# Patient Record
Sex: MALE | Race: WHITE
[De-identification: names, ages, dates, MRNs, and addresses within clinical notes are randomized per-mention and may not be internally consistent; named-entity substitution may affect disease eponyms.]

---

## 2020-07-18 ENCOUNTER — HOSPITAL ENCOUNTER (EMERGENCY)
Dept: HOSPITAL 8 - ED | Age: 80
Discharge: HOME | End: 2020-07-18
Payer: COMMERCIAL

## 2020-07-18 VITALS — SYSTOLIC BLOOD PRESSURE: 105 MMHG | DIASTOLIC BLOOD PRESSURE: 71 MMHG

## 2020-07-18 VITALS — HEIGHT: 75 IN | BODY MASS INDEX: 27.41 KG/M2 | WEIGHT: 220.46 LBS

## 2020-07-18 DIAGNOSIS — R00.0: ICD-10-CM

## 2020-07-18 DIAGNOSIS — R07.2: ICD-10-CM

## 2020-07-18 DIAGNOSIS — I48.0: Primary | ICD-10-CM

## 2020-07-18 DIAGNOSIS — I95.9: ICD-10-CM

## 2020-07-18 DIAGNOSIS — I10: ICD-10-CM

## 2020-07-18 LAB
ALBUMIN SERPL-MCNC: 3.1 G/DL (ref 3.4–5)
ALP SERPL-CCNC: 86 U/L (ref 45–117)
ALT SERPL-CCNC: 27 U/L (ref 12–78)
ANION GAP SERPL CALC-SCNC: 6 MMOL/L (ref 5–15)
BASOPHILS # BLD AUTO: 0.02 X10^3/UL (ref 0–0.1)
BASOPHILS NFR BLD AUTO: 0 % (ref 0–1)
BILIRUB SERPL-MCNC: 1 MG/DL (ref 0.2–1)
CALCIUM SERPL-MCNC: 8.3 MG/DL (ref 8.5–10.1)
CHLORIDE SERPL-SCNC: 105 MMOL/L (ref 98–107)
CREAT SERPL-MCNC: 1.12 MG/DL (ref 0.7–1.3)
EOSINOPHIL # BLD AUTO: 0.1 X10^3/UL (ref 0–0.4)
EOSINOPHIL NFR BLD AUTO: 2 % (ref 1–7)
ERYTHROCYTE [DISTWIDTH] IN BLOOD BY AUTOMATED COUNT: 14.2 % (ref 9.4–14.8)
LYMPHOCYTES # BLD AUTO: 1.38 X10^3/UL (ref 1–3.4)
LYMPHOCYTES NFR BLD AUTO: 23 % (ref 22–44)
MCH RBC QN AUTO: 31.5 PG (ref 27.5–34.5)
MCHC RBC AUTO-ENTMCNC: 32.5 G/DL (ref 33.2–36.2)
MCV RBC AUTO: 97.1 FL (ref 81–97)
MD: NO
MONOCYTES # BLD AUTO: 0.46 X10^3/UL (ref 0.2–0.8)
MONOCYTES NFR BLD AUTO: 8 % (ref 2–9)
NEUTROPHILS # BLD AUTO: 4.15 X10^3/UL (ref 1.8–6.8)
NEUTROPHILS NFR BLD AUTO: 68 % (ref 42–75)
PLATELET # BLD AUTO: 222 X10^3/UL (ref 130–400)
PMV BLD AUTO: 7 FL (ref 7.4–10.4)
PROT SERPL-MCNC: 6.1 G/DL (ref 6.4–8.2)
RBC # BLD AUTO: 4.64 X10^6/UL (ref 4.38–5.82)
T4 FREE SERPL-MCNC: 1.08 NG/DL (ref 0.76–1.46)
TROPONIN I SERPL-MCNC: < 0.015 NG/ML (ref 0–0.04)

## 2020-07-18 PROCEDURE — 96376 TX/PRO/DX INJ SAME DRUG ADON: CPT

## 2020-07-18 PROCEDURE — 36415 COLL VENOUS BLD VENIPUNCTURE: CPT

## 2020-07-18 PROCEDURE — 84439 ASSAY OF FREE THYROXINE: CPT

## 2020-07-18 PROCEDURE — 84443 ASSAY THYROID STIM HORMONE: CPT

## 2020-07-18 PROCEDURE — 84484 ASSAY OF TROPONIN QUANT: CPT

## 2020-07-18 PROCEDURE — 96361 HYDRATE IV INFUSION ADD-ON: CPT

## 2020-07-18 PROCEDURE — 71045 X-RAY EXAM CHEST 1 VIEW: CPT

## 2020-07-18 PROCEDURE — 96374 THER/PROPH/DIAG INJ IV PUSH: CPT

## 2020-07-18 PROCEDURE — 83735 ASSAY OF MAGNESIUM: CPT

## 2020-07-18 PROCEDURE — 85025 COMPLETE CBC W/AUTO DIFF WBC: CPT

## 2020-07-18 PROCEDURE — 80053 COMPREHEN METABOLIC PANEL: CPT

## 2020-07-18 PROCEDURE — 99291 CRITICAL CARE FIRST HOUR: CPT

## 2020-07-18 PROCEDURE — 99153 MOD SED SAME PHYS/QHP EA: CPT

## 2020-07-18 PROCEDURE — 93005 ELECTROCARDIOGRAM TRACING: CPT

## 2020-07-18 PROCEDURE — 99152 MOD SED SAME PHYS/QHP 5/>YRS: CPT

## 2020-07-18 NOTE — NUR
SPOKE WITH PT ABOUT POC. PT CURRENTLY STILL REFUSING CARDIOVERSION, STATES "I'M 
FEELING FINE NOW." PT AWARE HR REMAINS AS HIGH . PT CURRENTLY DENIES CP. 
PT REMAINS ON MONITORS, VSS. CONT TO MONITOR.

## 2020-07-18 NOTE — NUR
PT REMAINS A&OX4, REMAINS AT BASELINE MENTATION AND VS. PT CONTINUES TO DENY 
ANY CP. PT OK FOR D/C PER ERMD. PT WITH STEADY GAIT UPON D/C, PT REFUSES WC. PT 
VERBALIZED UNDERSTANDING OF D/C INSTRUCTIONS, HAS ALL OWN BELONGINGS UPON D/C.

## 2020-07-18 NOTE — NUR
CRISTI ESPINOZA MD EDUCATING PT ON ELECTRICAL CARDIOVERSION. PT REFUSES 
CARDIOVERSION AT THIS TIME "I JUST NEED SOME MORE FLUIDS AND I WILL CONVERT". 
PT SAID THAT IF HE DOES NOT CONVERT AFTER FLUIDS THEN HE WILL ALLOW THE 
CARDIOVERSION. PT CALM IN BED WITH CONT CARDIAC MONITOR, SPO2 AND BP Q 15 MIN, 
CALL LIGHT IN REACH. WIFE AT BEDSIDE.

## 2020-07-18 NOTE — NUR
CARDIOVERSION COMPLETED BY PAWEL DIAZ RN, PLEASE SEE CHARTING. PT CURRENTLY 
A&OX4, BACK TO BASELING MENTATION, VSS. PT REMAINS ON MONITORS. PT OBS THEN D/C 
EVENTUALLY. PT REMAINS IN NSR. PT AWARE OF POC. CALL LIGHT IN REACH, NEEDS 
ADDRESSED. CONT TO MONITOR.

## 2020-07-18 NOTE — NUR
PT BIB EMS FROM [facility]. PT WITH A FIB, HX OF SAME, BUT TODAY IS NOTED TO HAVE 
A FIB WITH RVR, HR IN 'S. PT DOES STATES SOME CP AT 3/10. STATES CP WAS 
7/10 ORIGINALLY, HOWEVER TOOK OWN HOME NITRO WITH SOME CP RELIEF NOTED. PT WAS 
ALSO GIVEN ASA 324MG PO PTA BY EMS. PT PLACED ON MONITORS, EKG COMPLETED. KATIE 
LFOLLOW ORDERS.

## 2021-04-23 ENCOUNTER — HOSPITAL ENCOUNTER (EMERGENCY)
Dept: HOSPITAL 8 - ED | Age: 81
Discharge: HOME | End: 2021-04-23
Payer: MEDICARE

## 2021-04-23 VITALS — BODY MASS INDEX: 29.49 KG/M2 | WEIGHT: 237.22 LBS | HEIGHT: 75 IN

## 2021-04-23 VITALS — SYSTOLIC BLOOD PRESSURE: 117 MMHG | DIASTOLIC BLOOD PRESSURE: 77 MMHG

## 2021-04-23 DIAGNOSIS — I48.91: ICD-10-CM

## 2021-04-23 DIAGNOSIS — I10: ICD-10-CM

## 2021-04-23 DIAGNOSIS — R94.31: ICD-10-CM

## 2021-04-23 DIAGNOSIS — R07.2: ICD-10-CM

## 2021-04-23 DIAGNOSIS — I47.1: Primary | ICD-10-CM

## 2021-04-23 LAB
ALBUMIN SERPL-MCNC: 3.5 G/DL (ref 3.4–5)
ALP SERPL-CCNC: 107 U/L (ref 45–117)
ALT SERPL-CCNC: 32 U/L (ref 12–78)
ANION GAP SERPL CALC-SCNC: 8 MMOL/L (ref 5–15)
BASOPHILS # BLD AUTO: 0.1 X10^3/UL (ref 0–0.1)
BASOPHILS NFR BLD AUTO: 1 % (ref 0–1)
BILIRUB SERPL-MCNC: 0.9 MG/DL (ref 0.2–1)
CALCIUM SERPL-MCNC: 8.8 MG/DL (ref 8.5–10.1)
CHLORIDE SERPL-SCNC: 104 MMOL/L (ref 98–107)
CREAT SERPL-MCNC: 1.14 MG/DL (ref 0.7–1.3)
EOSINOPHIL # BLD AUTO: 0.3 X10^3/UL (ref 0–0.4)
EOSINOPHIL NFR BLD AUTO: 3 % (ref 1–7)
ERYTHROCYTE [DISTWIDTH] IN BLOOD BY AUTOMATED COUNT: 14 % (ref 9.4–14.8)
LYMPHOCYTES # BLD AUTO: 2.2 X10^3/UL (ref 1–3.4)
LYMPHOCYTES NFR BLD AUTO: 26 % (ref 22–44)
MCH RBC QN AUTO: 32.4 PG (ref 27.5–34.5)
MCHC RBC AUTO-ENTMCNC: 34.5 G/DL (ref 33.2–36.2)
MD: NO
MONOCYTES # BLD AUTO: 0.8 X10^3/UL (ref 0.2–0.8)
MONOCYTES NFR BLD AUTO: 9 % (ref 2–9)
NEUTROPHILS # BLD AUTO: 5.1 X10^3/UL (ref 1.8–6.8)
NEUTROPHILS NFR BLD AUTO: 60 % (ref 42–75)
PLATELET # BLD AUTO: 229 X10^3/UL (ref 130–400)
PMV BLD AUTO: 7.3 FL (ref 7.4–10.4)
PROT SERPL-MCNC: 7 G/DL (ref 6.4–8.2)
RBC # BLD AUTO: 5.03 X10^6/UL (ref 4.38–5.82)
T4 (THYROXINE): 10.9 MCG/DL (ref 4.5–12.1)
TROPONIN I SERPL-MCNC: < 0.015 NG/ML (ref 0–0.04)

## 2021-04-23 PROCEDURE — 84484 ASSAY OF TROPONIN QUANT: CPT

## 2021-04-23 PROCEDURE — 83735 ASSAY OF MAGNESIUM: CPT

## 2021-04-23 PROCEDURE — 93005 ELECTROCARDIOGRAM TRACING: CPT

## 2021-04-23 PROCEDURE — 96374 THER/PROPH/DIAG INJ IV PUSH: CPT

## 2021-04-23 PROCEDURE — 85025 COMPLETE CBC W/AUTO DIFF WBC: CPT

## 2021-04-23 PROCEDURE — 96361 HYDRATE IV INFUSION ADD-ON: CPT

## 2021-04-23 PROCEDURE — 99291 CRITICAL CARE FIRST HOUR: CPT

## 2021-04-23 PROCEDURE — 84436 ASSAY OF TOTAL THYROXINE: CPT

## 2021-04-23 PROCEDURE — 36415 COLL VENOUS BLD VENIPUNCTURE: CPT

## 2021-04-23 PROCEDURE — 71045 X-RAY EXAM CHEST 1 VIEW: CPT

## 2021-04-23 PROCEDURE — 84443 ASSAY THYROID STIM HORMONE: CPT

## 2021-04-23 PROCEDURE — 80053 COMPREHEN METABOLIC PANEL: CPT

## 2021-04-23 NOTE — NUR
SEDATION/CARDIOVERSION WITH PROPOFOL NOT INDICATED. PROPOFOL THAT WAS PULLED UP 
WAS WASTED BY MYSELF WITH RASHEEDA SALMERON AS A WITNESS.

## 2021-04-23 NOTE — NUR
PT PRESENTS VIA [facility] WITH REPORTS OF CHEST PAIN THAT STARTED AROUND 0120 THIS 
MORNING. [facility] FOUND HR TO BE IN  AND PT WAS TRANSPORTED HERE. 2 PIVS 
PLACED AND DR PEREZ AT BEDSIDE FOR EVAL. PT IN SVT AND SUCCESSFULLY CONVERTED TO 
NSR WITH 6MG ADENOSINE.

## 2024-08-03 ENCOUNTER — HOSPITAL ENCOUNTER (EMERGENCY)
Facility: MEDICAL CENTER | Age: 84
End: 2024-08-03
Attending: EMERGENCY MEDICINE
Payer: COMMERCIAL

## 2024-08-03 VITALS
WEIGHT: 233.69 LBS | BODY MASS INDEX: 29.06 KG/M2 | DIASTOLIC BLOOD PRESSURE: 79 MMHG | RESPIRATION RATE: 17 BRPM | OXYGEN SATURATION: 96 % | HEIGHT: 75 IN | SYSTOLIC BLOOD PRESSURE: 154 MMHG | HEART RATE: 64 BPM | TEMPERATURE: 97.2 F

## 2024-08-03 DIAGNOSIS — S90.212A SUBUNGUAL HEMATOMA OF GREAT TOE OF LEFT FOOT, INITIAL ENCOUNTER: ICD-10-CM

## 2024-08-03 LAB — GLUCOSE BLD STRIP.AUTO-MCNC: 111 MG/DL (ref 65–99)

## 2024-08-03 PROCEDURE — 99284 EMERGENCY DEPT VISIT MOD MDM: CPT

## 2024-08-03 PROCEDURE — 82962 GLUCOSE BLOOD TEST: CPT

## 2024-08-03 RX ORDER — PRAMIPEXOLE DIHYDROCHLORIDE 0.25 MG/1
0.25 TABLET ORAL PRN
COMMUNITY

## 2024-08-03 RX ORDER — ATORVASTATIN CALCIUM 40 MG/1
40 TABLET, FILM COATED ORAL NIGHTLY
COMMUNITY

## 2024-08-03 RX ORDER — LOSARTAN POTASSIUM 25 MG/1
25 TABLET ORAL DAILY
COMMUNITY

## 2024-08-03 RX ORDER — DICLOFENAC SODIUM 75 MG/1
75 TABLET, DELAYED RELEASE ORAL 2 TIMES DAILY PRN
COMMUNITY

## 2024-08-03 RX ORDER — METOPROLOL SUCCINATE 25 MG/1
25 TABLET, EXTENDED RELEASE ORAL 2 TIMES DAILY
COMMUNITY

## 2024-08-03 RX ORDER — CLONIDINE HYDROCHLORIDE 0.1 MG/1
0.1 TABLET ORAL PRN
COMMUNITY

## 2024-08-03 RX ORDER — ISOSORBIDE MONONITRATE 30 MG/1
30 TABLET, EXTENDED RELEASE ORAL DAILY
COMMUNITY

## 2024-08-03 RX ORDER — AMLODIPINE BESYLATE 5 MG/1
5 TABLET ORAL DAILY
COMMUNITY

## 2024-08-03 NOTE — DISCHARGE INSTRUCTIONS
You are seen in the Emergency Department after an injury to your left great toenail.  The skin appears to be intact at this point in time protecting the toenail allowing it to heal from the inside out appears to be the best plan of action to reduce risk of infection.  You may wash with soap and water.  Please be very gentle to prevent pulling the nail all the way off.  Your nail will ultimately fall off, however delaying this for a period of time, hopefully at least a week, should reduce your risk of infection    Please follow-up with your primary care provider and podiatrist.    Return to the emergency department or seek medical attention if you develop:  Spreading redness, worsening pain over time, fevers, any other new or concerning findings

## 2024-08-03 NOTE — ED NOTES
This is an 84 y.o male Pt Hx of Diabetes, HTN and A.Fib on Eliquis, Pt has a wound on his left foot big toe that he noticed a couple of weeks ago, he states that He pulled his socks and the nail got stock , Echinosis, swelling and redness was noted on right great toe, A&OX4, Ambulatory from Triage top room.

## 2024-08-03 NOTE — ED TRIAGE NOTES
Vitals:    08/03/24 1104   BP: 134/71   Pulse: 65   Resp: 16   Temp: 36.3 °C (97.3 °F)   SpO2: 96%     Chief Complaint   Patient presents with    Foot Pain     Left foot big toe     Pt has a wound on his left foot big toe that he noticed a couple of weeks ago, he is a diabetic.     Pt is ambulatory to and from triage and is alert and oriented x 4.

## 2024-08-03 NOTE — ED NOTES
Medication Reconciliation    Medication reconciliation is complete per patient's personal medication list . He and his wife state that he took his morning medications, but are unsure of their names.  - Allergies reviewed.  - No outpatient antibiotics in the last 30 days.  - Patient is taking apixaban. Last dose taken on 8/3/2024 am.  - Patient's home pharmacy is [facility] on [address] [phone].    Whit Kruger, Pharmacy Intern